# Patient Record
Sex: MALE | URBAN - METROPOLITAN AREA
[De-identification: names, ages, dates, MRNs, and addresses within clinical notes are randomized per-mention and may not be internally consistent; named-entity substitution may affect disease eponyms.]

---

## 2017-04-11 ENCOUNTER — APPOINTMENT (OUTPATIENT)
Dept: OPHTHALMOLOGY | Facility: CLINIC | Age: 57
End: 2017-04-11
Attending: OPHTHALMOLOGY
Payer: COMMERCIAL

## 2017-04-11 DIAGNOSIS — H25.13 SENILE NUCLEAR SCLEROSIS, BILATERAL: Primary | ICD-10-CM

## 2017-04-11 DIAGNOSIS — H40.003 GLAUCOMA SUSPECT, BILATERAL: ICD-10-CM

## 2017-04-11 DIAGNOSIS — H52.13 HIGH MYOPIA, BILATERAL: ICD-10-CM

## 2017-04-11 PROCEDURE — 99214 OFFICE O/P EST MOD 30 MIN: CPT | Mod: ZF

## 2017-04-11 PROCEDURE — 92250 FUNDUS PHOTOGRAPHY W/I&R: CPT | Mod: ZF | Performed by: OPHTHALMOLOGY

## 2017-04-11 PROCEDURE — 92083 EXTENDED VISUAL FIELD XM: CPT | Mod: ZF | Performed by: OPHTHALMOLOGY

## 2017-04-11 PROCEDURE — 76519 ECHO EXAM OF EYE: CPT | Mod: ZF | Performed by: OPHTHALMOLOGY

## 2017-04-11 ASSESSMENT — REFRACTION_WEARINGRX
OS_SPHERE: -18.00
OD_AXIS: 105
OD_ADD: +2.50
OD_CYLINDER: +2.75
SPECS_TYPE: PAL
OS_ADD: +2.50
OS_CYLINDER: +4.25
OD_SPHERE: -15.25
OS_AXIS: 070

## 2017-04-11 ASSESSMENT — TONOMETRY
IOP_METHOD: APPLANATION
OS_IOP_MMHG: 15
OD_IOP_MMHG: 12

## 2017-04-11 ASSESSMENT — EXTERNAL EXAM - RIGHT EYE: OD_EXAM: NORMAL

## 2017-04-11 ASSESSMENT — VISUAL ACUITY
METHOD: SNELLEN - LINEAR
OD_PH_CC: 20/30-1
OS_CC: 20/40
OD_CC: 20/40
CORRECTION_TYPE: GLASSES
OD_CC+: -1
OS_CC+: -1

## 2017-04-11 ASSESSMENT — CUP TO DISC RATIO
OD_RATIO: 0.75
OS_RATIO: 0.75

## 2017-04-11 ASSESSMENT — EXTERNAL EXAM - LEFT EYE: OS_EXAM: NORMAL

## 2017-04-11 ASSESSMENT — SLIT LAMP EXAM - LIDS
COMMENTS: NORMAL
COMMENTS: NORMAL

## 2017-04-11 ASSESSMENT — CONF VISUAL FIELD
OS_NORMAL: 1
OD_NORMAL: 1

## 2017-04-11 NOTE — MR AVS SNAPSHOT
After Visit Summary   4/11/2017    Luc Pham    MRN: [id number]           Patient Information     Date Of Birth          1960        Visit Information        Provider Department      4/11/2017 8:30 AM [facility]ins [facility]        Today's Diagnoses     Senile nuclear sclerosis, bilateral    -  1    Glaucoma suspect, bilateral          Care Instructions    Patient will return to clinic in 8-12 months with repeat visual field test, dilated eye exam and OCT with RNFL analysis.  Patient will call and return to clinic sooner with glare testing if interested in proceeding with cataract surgery.  Patient will also follow up with Dr. Zapata for refraction if interested in a new prescription for glasses.          Follow-ups after your visit        Future tests that were ordered for you today     Open Future Orders        Priority Expected Expires Ordered    Fundus Photos OU (both eyes) Routine  10/11/2018 4/11/2017            Who to contact     Please call your clinic at [phone] to:    Ask questions about your health    Make or cancel appointments    Discuss your medicines    Learn about your test results    Speak to your doctor   If you have compliments or concerns about an experience at your clinic, or if you wish to file a complaint, please contact [facility] Patient Relations at [phone] or email us at [email or web address]         Additional Information About Your Visit        MyChart Information     [employer] is an electronic gateway that provides easy, online access to your medical records. With [employer], you can request a clinic appointment, read your test results, renew a prescription or communicate with your care team.     To sign up for [employer] visit the website at [email or web address]   You will be asked to enter the access code listed below, as well as some personal information. Please follow the directions to create your  username and password.     Your access code is: [email or web address]  Expires: 2017  8:30 AM     Your access code will  in 90 days. If you need help or a new code, please contact your [facility] or call [phone] for assistance.        Care EveryWhere ID     This is your Care EveryWhere ID. This could be used by other organizations to access your [facility] medical records  [id number]         Blood Pressure from Last 3 Encounters:   05 124/80    Weight from Last 3 Encounters:   05 79.8 kg (176 lb)              We Performed the Following     IOL Biometry w/ IOL calc OU (both eye)     OVF 24-2 Dynamic OU        Primary Care Provider Office Phone # Fax #    Walker Haynes [phone]       [facility] [address] [place] [address]        Thank you!     Thank you for choosing [facility]  for your care. Our goal is always to provide you with excellent care. Hearing back from our patients is one way we can continue to improve our services. Please take a few minutes to complete the written survey that you may receive in the mail after your visit with us. Thank you!             Your Updated Medication List - Protect others around you: Learn how to safely use, store and throw away your medicines at www.disposemymeds.org.          This list is accurate as of: 17  9:33 AM.  Always use your most recent med list.                   Brand Name Dispense Instructions for use    ASPIR-81 PO      Take 1 tablet by mouth daily       clopidogrel 75 MG tablet    PLAVIX     Take 1 tablet by mouth daily       lisinopril 2.5 MG tablet    PRINIVIL/Zestril     Take 2.5 mg by mouth daily       metoprolol 12.5 MG Tabs half-tab    LOPRESSOR     Take 12.5 mg by mouth daily       NATTOKINASE PO      With Catalase daily       RA KRILL  MG Caps      Take 1 capsule by mouth daily       Ubiquinol 50 MG Caps      Take 1 capsule by mouth daily

## 2017-04-11 NOTE — NURSING NOTE
Chief Complaints and History of Present Illnesses   Patient presents with     Follow Up For     10 month f/u POAG suspect     HPI    Affected eye(s):  Both   Symptoms:     Decreased vision   Floaters (Comment: stable)   No flashes      Duration:  10 months      Do you have eye pain now?:  No      Comments:  10 month f/u POAG suspect; VF, dilation, IOL Master, and disc photos today  Pt believes that vision has worsened since last visit    Cassy VIERA 8:16 AM April 11, 2017

## 2017-04-11 NOTE — PATIENT INSTRUCTIONS
Patient will return to clinic in 8-12 months with repeat visual field test, dilated eye exam and OCT with RNFL analysis.  Patient will call and return to clinic sooner with glare testing if interested in proceeding with cataract surgery.  Patient will also follow up with Dr. Zapata for refraction if interested in a new prescription for glasses.

## 2017-04-11 NOTE — PROGRESS NOTES
1)Glc Suspect based on CDR -- MRI WNL in 2016 -- K pachy: 524/524   Tmax:     HVF:Full c fluct      CDR:0.8/0.8     HRT/OCT: Mild RNFL thinning (poor reliability)      FHX of Glc: No      Gonio: open      Intolerant to:      Asthma/COPD: No, tolerating po BB  Steroid Use: No    Kidney Stones: No    Sulfa Allergy: No      IOP targets: Hteens-L20s -- IOP good  2)NS OU (OS>OD) --  Likely visually significant -- pt notes glare and slightly blurred vision but is meeting needs  3)H/O High Myopia (-15D)  c Myopic Degeneration  4)H/O CAD s/p stenting -- on Plavix  5)Astigmatism -- consider Pentacam prior to CE for possible toric     Patient will return to clinic in 8-12 months with repeat visual field test, dilated eye exam and OCT with RNFL analysis.  Patient will call and return to clinic sooner with glare testing if interested in proceeding with cataract surgery.  Patient will also follow up with Dr. Zapata for refraction if interested in a new prescription for glasses.      Attending Physician Attestation:  Complete documentation of historical and exam elements from today's encounter can be found in the full encounter summary report (not reduplicated in this progress note). I personally obtained the chief complaint(s) and history of present illness.  I confirmed and edited as necessary the review of systems, past medical/surgical history, family history, social history, and examination findings as documented by others; and I examined the patient myself. I personally reviewed the relevant tests, images, and reports as documented above. I formulated and edited as necessary the assessment and plan and discussed the findings and management plan with the patient and family.  - Cheri Hutchins MD 9:32 AM 4/11/2017